# Patient Record
Sex: FEMALE | URBAN - METROPOLITAN AREA
[De-identification: names, ages, dates, MRNs, and addresses within clinical notes are randomized per-mention and may not be internally consistent; named-entity substitution may affect disease eponyms.]

---

## 2023-03-24 ENCOUNTER — APPOINTMENT (OUTPATIENT)
Dept: URBAN - METROPOLITAN AREA CLINIC 317 | Age: 73
Setting detail: DERMATOLOGY
End: 2023-03-25

## 2023-03-24 DIAGNOSIS — Z41.9 ENCOUNTER FOR PROCEDURE FOR PURPOSES OTHER THAN REMEDYING HEALTH STATE, UNSPECIFIED: ICD-10-CM

## 2023-03-24 PROCEDURE — OTHER COSMETIC CONSULTATION: MICRO-NEEDLING: OTHER

## 2023-03-24 PROCEDURE — OTHER COSMETIC CONSULTATION: BOTOX: OTHER

## 2023-03-24 PROCEDURE — OTHER ADDITIONAL NOTES: OTHER

## 2023-03-24 PROCEDURE — OTHER COSMETIC CONSULTATION - MICRO-NEEDLING: OTHER

## 2023-03-24 PROCEDURE — OTHER COSMETIC CONSULTATION: CHEMICAL PEELS: OTHER

## 2023-03-24 ASSESSMENT — LOCATION SIMPLE DESCRIPTION DERM: LOCATION SIMPLE: RIGHT LIP

## 2023-03-24 ASSESSMENT — LOCATION DETAILED DESCRIPTION DERM: LOCATION DETAILED: RIGHT PHILTRAL RIDGE

## 2023-03-24 ASSESSMENT — LOCATION ZONE DERM: LOCATION ZONE: LIP

## 2023-03-24 NOTE — PROCEDURE: ADDITIONAL NOTES
Detail Level: Detailed
Additional Notes: Discussed possible filler for nasolabial and marionette lines, to schedule a consult with Dr. Schuster or Dr. Bose.\\n\\nThoroughly discussed Microneedling with PRP quoted at $600 per treatment (brochure given) and chemical peel quoted $350 per treatment. Pt interested in microneedling. will call to schedule, aware to arrive 30 min prior for topical numbing. Denies hx of cold sores. \\n\\nAlso discussed Botox quoted $13/unit and estimated starting with 6 units in upper lip area. Disucssed possible side effects of injecting this area - heavy feeling of upper lip, short duration of action (1-2 months), drooling, difficulty drinking from straws.
Render Risk Assessment In Note?: no